# Patient Record
Sex: MALE | Race: WHITE | NOT HISPANIC OR LATINO | Employment: FULL TIME | URBAN - METROPOLITAN AREA
[De-identification: names, ages, dates, MRNs, and addresses within clinical notes are randomized per-mention and may not be internally consistent; named-entity substitution may affect disease eponyms.]

---

## 2017-03-01 ENCOUNTER — GENERIC CONVERSION - ENCOUNTER (OUTPATIENT)
Dept: OTHER | Facility: OTHER | Age: 62
End: 2017-03-01

## 2018-01-22 VITALS
BODY MASS INDEX: 25.71 KG/M2 | HEART RATE: 76 BPM | WEIGHT: 160 LBS | SYSTOLIC BLOOD PRESSURE: 128 MMHG | DIASTOLIC BLOOD PRESSURE: 76 MMHG | HEIGHT: 66 IN

## 2021-04-28 ENCOUNTER — TELEPHONE (OUTPATIENT)
Dept: UROLOGY | Facility: AMBULATORY SURGERY CENTER | Age: 66
End: 2021-04-28

## 2021-04-28 NOTE — TELEPHONE ENCOUNTER
Previous patient of Janelle Fernandez is calling to set up appointment  Scheduled with Pierce Fuentes on 4/30      Has Medicare ID [id number]  [employer] [id number]

## 2021-04-30 ENCOUNTER — OFFICE VISIT (OUTPATIENT)
Dept: UROLOGY | Facility: MEDICAL CENTER | Age: 66
End: 2021-04-30
Payer: MEDICARE

## 2021-04-30 VITALS
HEART RATE: 64 BPM | WEIGHT: 159 LBS | HEIGHT: 66 IN | BODY MASS INDEX: 25.55 KG/M2 | DIASTOLIC BLOOD PRESSURE: 76 MMHG | SYSTOLIC BLOOD PRESSURE: 130 MMHG

## 2021-04-30 DIAGNOSIS — N52.9 ERECTILE DYSFUNCTION, UNSPECIFIED ERECTILE DYSFUNCTION TYPE: ICD-10-CM

## 2021-04-30 DIAGNOSIS — R97.21 INCREASING PSA LEVEL AFTER TREATMENT FOR PROSTATE CANCER: ICD-10-CM

## 2021-04-30 DIAGNOSIS — F52.32 ANORGASMIA OF MALE: Primary | ICD-10-CM

## 2021-04-30 DIAGNOSIS — Z85.46 HISTORY OF PROSTATE CANCER: ICD-10-CM

## 2021-04-30 PROCEDURE — 99203 OFFICE O/P NEW LOW 30 MIN: CPT | Performed by: UROLOGY

## 2021-04-30 RX ORDER — LOSARTAN POTASSIUM 50 MG/1
50 TABLET ORAL DAILY
COMMUNITY
Start: 2021-02-16

## 2021-04-30 RX ORDER — MULTIVITAMIN
1 TABLET ORAL DAILY
COMMUNITY

## 2021-04-30 RX ORDER — MELATONIN
1000 DAILY
COMMUNITY

## 2021-04-30 NOTE — PROGRESS NOTES
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 and manages his situation on his own for the most part  I informed him that a  PSA over 0 2-0 3 is considered a biochemical recurrence  At that point we would institute salvage radiation therapy  Diagnoses and all orders for this visit:    Anorgasmia of male    Erectile dysfunction, unspecified erectile dysfunction type    History of prostate cancer    Increasing PSA level after treatment for prostate cancer    Other orders  -     losartan (COZAAR) 50 mg tablet; Take 50 mg by mouth daily  -     Multiple Vitamin (multivitamin) tablet; Take 1 tablet by mouth daily  -     cholecalciferol (VITAMIN D3) 1,000 units tablet; Take 1,000 Units by mouth daily  -     ZINC-VITAMIN C MT; Apply to the mouth or throat          Subjective:      Patient ID: Jesse Patel is a 76 y o  male  HPI   Anorgasmia: This is the reason for today's office visit  The patient has done quite a bit of Internet research and presents with a number of algorithm is a for potential treatment  I reviewed these briefly and it appears that they all rely on intact genital tract  They do not appear to applied a patient who was had a robotic prostatectomy  Pt using pump for erection  PDE-5's caused headaches  Post-op he had ED with orgasms until about 8 months ago  No longer having orgasms  The patient thinks he may have also lost some  cutaneous sensation on the penis    No sx or underlying causes of neuropathy  History of prostate cancer:   [place] prostate cancer in 3 of 12 cores, grade group 2 from biopsy obtained on December 19, 2016  PSA at the time of diagnosis was 3 98  RP at [facility] on Feb 17, 2017  Pathology revealed [place] ( 3+4) with negative margins  No lymph node involvement  Entire report is available in Care everywhere  Pt has been discharged by [facility]  He notes no significant urinary symptons  He denies other significant urinary symptoms  He denies gross hematuria, urinary tract infections or incontinence  He is taking neither medications nor supplements for his symptoms  PSA:  0 03 on February 17, 2021  The patient's PSA was undetectable until August 2018 1 it was 0 01  Since then, the patient's PSA has varied from 0 01 to the current level of 0 03     AUA SYMPTOM SCORE      Most Recent Value   AUA SYMPTOM SCORE   How often have you had a sensation of not emptying your bladder completely after you finished urinating? 0   How often have you had to urinate again less than two hours after you finished urinating? 0   How often have you found you stopped and started again several times when you urinate?  0   How often have you found it difficult to postpone urination? 0   How often have you had a weak urinary stream?  0   How often have you had to push or strain to begin urination? 0   How many times did you most typically get up to urinate from the time you went to bed at night until the time you got up in the morning?   1   Quality of Life: If you were to spend the rest of your life with your urinary condition just the way it is now, how would you feel about that?  1   AUA SYMPTOM SCORE  1          The following portions of the patient's history were reviewed and updated as appropriate: allergies, current medications, past family history, past medical history, past social history, past surgical history and problem list     Review of Systems   Constitutional: Negative for activity change and fatigue  Respiratory: Negative for shortness of breath and wheezing  Cardiovascular: Negative for chest pain  Hypertension  Gastrointestinal: Negative for abdominal pain  Genitourinary: Negative for difficulty urinating, dysuria, frequency, hematuria and urgency  Musculoskeletal: Negative for back pain and gait problem  Skin: Negative  Allergic/Immunologic: Negative  Neurological: Negative  Psychiatric/Behavioral: Negative  Objective:      /76   Pulse 64   Ht 5' 6" (1 676 m)   Wt 72 1 kg (159 lb)   BMI 25 66 kg/m²          Physical Exam  Constitutional:       Appearance: He is well-developed  HENT:      Head: Normocephalic and atraumatic  Neck:      Musculoskeletal: Normal range of motion  Pulmonary:      Effort: Pulmonary effort is normal    Musculoskeletal: Normal range of motion  Skin:     General: Skin is warm and dry  Neurological:      Mental Status: He is alert and oriented to person, place, and time  Psychiatric:         Behavior: Behavior normal          Thought Content:  Thought content normal          Judgment: Judgment normal

## 2021-04-30 NOTE — ASSESSMENT & PLAN NOTE
I told the patient that I have not dealt with this problem before  It is not clear to me why he may have lost orgasm weak sensation so long after his prostatectomy  I have recommended that he return to the [facility] for further evaluation of this unusual problem

## 2021-04-30 NOTE — ASSESSMENT & PLAN NOTE
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

## 2023-01-15 ENCOUNTER — TELEPHONE (OUTPATIENT)
Dept: UROLOGY | Facility: MEDICAL CENTER | Age: 68
End: 2023-01-15

## 2023-01-15 NOTE — TELEPHONE ENCOUNTER
Patient called in stating asking if office does telemedicine appointments  Please call patient to discuss

## 2023-03-08 ENCOUNTER — OFFICE VISIT (OUTPATIENT)
Dept: FAMILY MEDICINE CLINIC | Facility: CLINIC | Age: 68
End: 2023-03-08

## 2023-03-08 VITALS
WEIGHT: 154 LBS | BODY MASS INDEX: 24.75 KG/M2 | HEART RATE: 67 BPM | DIASTOLIC BLOOD PRESSURE: 74 MMHG | SYSTOLIC BLOOD PRESSURE: 120 MMHG | HEIGHT: 66 IN

## 2023-03-08 DIAGNOSIS — Z02.89 ENCOUNTER FOR FEDERAL AVIATION ADMINISTRATION (FAA) EXAMINATION: Primary | ICD-10-CM

## 2024-03-04 ENCOUNTER — OFFICE VISIT (OUTPATIENT)
Dept: FAMILY MEDICINE CLINIC | Facility: CLINIC | Age: 69
End: 2024-03-04

## 2024-03-04 VITALS
HEIGHT: 67 IN | BODY MASS INDEX: 25.58 KG/M2 | WEIGHT: 163 LBS | HEART RATE: 62 BPM | SYSTOLIC BLOOD PRESSURE: 112 MMHG | DIASTOLIC BLOOD PRESSURE: 72 MMHG

## 2024-03-04 DIAGNOSIS — Z02.89 ENCOUNTER FOR FEDERAL AVIATION ADMINISTRATION (FAA) EXAMINATION: Primary | ICD-10-CM

## 2024-03-04 PROCEDURE — 99499 UNLISTED E&M SERVICE: CPT | Performed by: FAMILY MEDICINE

## 2024-03-04 NOTE — PROGRESS NOTES
Chief Complaint   Patient presents with   • Physical Exam     Faa 3rd class wears correctives no ekg